# Patient Record
Sex: FEMALE | Race: WHITE
[De-identification: names, ages, dates, MRNs, and addresses within clinical notes are randomized per-mention and may not be internally consistent; named-entity substitution may affect disease eponyms.]

---

## 2020-07-06 ENCOUNTER — HOSPITAL ENCOUNTER (OUTPATIENT)
Dept: HOSPITAL 46 - DS | Age: 55
Discharge: HOME | End: 2020-07-06
Attending: SURGERY
Payer: COMMERCIAL

## 2020-07-06 VITALS — HEIGHT: 66 IN | WEIGHT: 209 LBS | BODY MASS INDEX: 33.59 KG/M2

## 2020-07-06 DIAGNOSIS — M79.7: ICD-10-CM

## 2020-07-06 DIAGNOSIS — Z78.0: ICD-10-CM

## 2020-07-06 DIAGNOSIS — K59.00: ICD-10-CM

## 2020-07-06 DIAGNOSIS — Z79.899: ICD-10-CM

## 2020-07-06 DIAGNOSIS — Z12.11: Primary | ICD-10-CM

## 2020-07-06 DIAGNOSIS — Z79.82: ICD-10-CM

## 2020-07-06 DIAGNOSIS — E03.9: ICD-10-CM

## 2020-07-06 PROCEDURE — 0DJD8ZZ INSPECTION OF LOWER INTESTINAL TRACT, VIA NATURAL OR ARTIFICIAL OPENING ENDOSCOPIC: ICD-10-PCS | Performed by: SURGERY

## 2020-07-06 PROCEDURE — G0500 MOD SEDAT ENDO SERVICE >5YRS: HCPCS

## 2020-07-07 NOTE — OR
[facility]
                                    [address], [place]  [address]
_________________________________________________________________________________________
                                                                 Signed   
 
 
DATE OF OPERATION:
07/06/2020
 
SURGEON:
Abril Peña MD
 
PREOPERATIVE DIAGNOSES:
1. Chronic long-standing constipation.
2. Colon screening.
 
POSTOPERATIVE DIAGNOSIS:
Normal colon.
 
PROCEDURE:
Total colonoscopy with visualization of cecum.
 
ANESTHESIA:
Intravenous sedation, fentanyl 100 mcg and Versed 10 mg.
 
INDICATION:
This 54-year-old white woman is a patient of BEENA Lucas.  She has had lifelong
constipation.  Various preparations have been rather ineffective in controlling her
problem.  She did tolerate her recent MiraLAX bowel prep well.  She has tried MiraLAX on
its own before, which was not durably effective.  She has never had a colonoscopy in the
past.  She has no family history of colon cancer.  She is admitted at this time to
undergo colonoscopy.  She understands the risks of bleeding, infection, and perforation. 
 
FINDINGS:
The prep was pretty good actually.  There were a few watermelon seeds implying poor
compliance with recommendation of a low-fiber, but certainly adequate for visualization
throughout.  Actual intubation of the cecum was not forthcoming though it was
visualized.  The biopsy forcep was used to grasp the mucosa behind the ileocecal valve
and visualize it more fully.  The remaining colon was normal. 
 
DESCRIPTION OF PROCEDURE:
The patient was brought to the endoscopy suite and placed in lateral decubitus position
given intravenous sedation to the point of slurred speech and nystagmus.  Digital rectal
examination was normal. 
 
An Olympus video colonoscope was passed in the rectum and manipulated throughout the
colon.  The prep was good overall.  The scope was advanced to the hepatic flexure.
Passage through this area was challenging.  Abdominal wall stabilization and other
 
    Electronically Signed By: ABRIL PEÑA MD  07/07/20 0912
_________________________________________________________________________________________
PATIENT NAME:     NORA SEGOVIA                         
MEDICAL RECORD #: [id number]            OPERATIVE REPORT              
          ACCT #: [id number]  
DATE OF BIRTH:   [birth year]            REPORT #: [id number]      
PHYSICIAN:        ABRIL PEÑA MD                 
PCP:              JAZMYN MERLOS PAC               
REPORT IS CONFIDENTIAL AND NOT TO BE RELEASED WITHOUT AUTHORIZATION
 
 
                                  [facility]
                                    [address], [place]  [address]
_________________________________________________________________________________________
                                                                 Signed   
 
 
maneuvers failed to allow for passage of the scope despite every effort.  She was
ultimately turned to supine position and without abdominal wall stabilization, careful
manipulation allowed for passage of the scope to the right colon ultimately to visualize
though not intubate the cecum.  The biopsy forcep was used to grasp the cecal mucosa
behind the ileocecal valve, elevated showing no sign of abnormality.  Photographs were
taken.  The scope was then carefully withdrawn and examination throughout showed no sign
of polyps, cancer, or diverticular formations or other issues.  The scope was ultimately
removed.  The patient was taken to the recovery room in good condition. 
 
CONCLUDING DIAGNOSIS:
Normal colon, probable idiopathic constipation.
 
PLAN:
We will recommend MiraLAX 2 capsules daily now that she has been reasonably cleaned out.
 She will return to the ongoing care of Jazmyn Merlos.  Recommend a repeat colonoscopy in
10 years sooner if needed for bleeding, etc. 
 
 
 
            ________________________________________
            MD KINDRA Smith/STACIA
Job #:  [id number]
DD:  07/06/2020 09:24:04
DT:  07/06/2020 10:18:15
 
cc:            Jazmyn Merlos PA-C
 
 
Copies:  JAZMYN MERLOS
~
 
 
 
 
 
 
 
 
 
 
    Electronically Signed By: ABRIL PEÑA MD  07/07/20 0912
_________________________________________________________________________________________
PATIENT NAME:     NORA SEGOVIA ANN                         
MEDICAL RECORD #: [id number]            OPERATIVE REPORT              
          ACCT #: [id number]  
DATE OF BIRTH:   [birth year]            REPORT #: [id number]      
PHYSICIAN:        ABRIL PEÑA MD                 
PCP:              JAZMYN MERLOS               
REPORT IS CONFIDENTIAL AND NOT TO BE RELEASED WITHOUT AUTHORIZATION

## 2022-06-10 ENCOUNTER — HOSPITAL ENCOUNTER (EMERGENCY)
Dept: HOSPITAL 46 - ED | Age: 57
Discharge: HOME | End: 2022-06-10
Payer: COMMERCIAL

## 2022-06-10 VITALS — WEIGHT: 190.44 LBS | BODY MASS INDEX: 30.61 KG/M2 | HEIGHT: 66 IN

## 2022-06-10 DIAGNOSIS — Z79.84: ICD-10-CM

## 2022-06-10 DIAGNOSIS — Z87.891: ICD-10-CM

## 2022-06-10 DIAGNOSIS — S46.811A: ICD-10-CM

## 2022-06-10 DIAGNOSIS — S16.1XXA: Primary | ICD-10-CM
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
## 2022-06-10 DIAGNOSIS — S46.812A: ICD-10-CM

## 2022-06-10 DIAGNOSIS — Z79.899: ICD-10-CM
